# Patient Record
Sex: MALE | Race: BLACK OR AFRICAN AMERICAN | NOT HISPANIC OR LATINO | Employment: STUDENT | URBAN - METROPOLITAN AREA
[De-identification: names, ages, dates, MRNs, and addresses within clinical notes are randomized per-mention and may not be internally consistent; named-entity substitution may affect disease eponyms.]

---

## 2022-04-17 ENCOUNTER — HOSPITAL ENCOUNTER (EMERGENCY)
Facility: HOSPITAL | Age: 22
Discharge: HOME | End: 2022-04-17
Attending: EMERGENCY MEDICINE | Admitting: EMERGENCY MEDICINE
Payer: MEDICARE

## 2022-04-17 VITALS
RESPIRATION RATE: 18 BRPM | TEMPERATURE: 97.8 F | DIASTOLIC BLOOD PRESSURE: 112 MMHG | SYSTOLIC BLOOD PRESSURE: 145 MMHG | OXYGEN SATURATION: 100 % | HEART RATE: 65 BPM | WEIGHT: 155 LBS | BODY MASS INDEX: 22.96 KG/M2 | HEIGHT: 69 IN

## 2022-04-17 DIAGNOSIS — M79.10 SORE MUSCLES: Primary | ICD-10-CM

## 2022-04-17 DIAGNOSIS — T14.8XXA MUSCLE STRAIN: ICD-10-CM

## 2022-04-17 PROCEDURE — 99283 EMERGENCY DEPT VISIT LOW MDM: CPT

## 2022-04-17 PROCEDURE — 99284 EMERGENCY DEPT VISIT MOD MDM: CPT | Performed by: EMERGENCY MEDICINE

## 2022-04-17 RX ORDER — IBUPROFEN 600 MG/1
600 TABLET ORAL EVERY 6 HOURS PRN
Qty: 30 TABLET | Refills: 0 | Status: SHIPPED | OUTPATIENT
Start: 2022-04-17

## 2022-04-17 RX ORDER — IBUPROFEN 600 MG/1
600 TABLET ORAL ONCE
Status: COMPLETED | OUTPATIENT
Start: 2022-04-17 | End: 2022-04-17

## 2022-04-17 RX ADMIN — IBUPROFEN 600 MG: 600 TABLET, FILM COATED ORAL at 23:32

## 2022-04-17 NOTE — Clinical Note
Eva Hampton accompanied Rob Aguilar to the emergency department on 4/17/2022  Return date if applicable: 42/20/8615        If you have any questions or concerns, please don't hesitate to call        Saniya Cotton MD

## 2022-04-18 NOTE — ED PROVIDER NOTES
History  Chief Complaint   Patient presents with    Arm Pain     pt states he worked out the other day and had bad form and did everything the wrong way and now c/o bilateral arm pain  pt took motrin prior to arrival       HPI  59-year-old man with no significant past medical history presents to ED for evaluation of bilateral arm  Patient reports he was doing biceps exercises 3 days ago  He states he has not worked out 2 years  He states he had bad form when he was working out  He states he used too much weight  He states after the exercises, he felt like his biceps were sore  He states he wanted to get checked out today because he "does not know if this is normal to be this sore " He complains of worsening pain when he moves his arms  He has tried Motrin once for pain  He has not tried any other therapies  He has no other physical complaints or concerns at this time  None       History reviewed  No pertinent past medical history  History reviewed  No pertinent surgical history  History reviewed  No pertinent family history  I have reviewed and agree with the history as documented  E-Cigarette/Vaping     E-Cigarette/Vaping Substances     Social History     Tobacco Use    Smoking status: Current Every Day Smoker    Smokeless tobacco: Never Used   Substance Use Topics    Alcohol use: Yes    Drug use: Yes     Types: Marijuana        Review of Systems   All other systems reviewed and are negative        Physical Exam  ED Triage Vitals   Temperature Pulse Respirations Blood Pressure SpO2   04/17/22 2244 04/17/22 2241 04/17/22 2241 04/17/22 2241 04/17/22 2241   97 8 °F (36 6 °C) 65 18 (!) 145/112 100 %      Temp src Heart Rate Source Patient Position - Orthostatic VS BP Location FiO2 (%)   -- -- -- -- --             Pain Score       --                    Orthostatic Vital Signs  Vitals:    04/17/22 2241   BP: (!) 145/112   Pulse: 65       Physical Exam   Constitutional:  Well developed, no acute distress  HEENT:  Conjunctiva normal  Oropharynx moist  Respiratory:  No respiratory distress  Cardiovascular:  Normal rate  GI:  Soft, nondistended, nontender  :  No costovertebral angle tenderness   Musculoskeletal:  No edema, no deformities  TTP over bilateral upper arms  Integument:  Well hydrated, no rash   Lymphatic:  No lymphadenopathy noted   Neurologic:  Alert & oriented x 3, normal motor function, no focal deficits noted   Psychiatric:  Speech and behavior appropriate         ED Medications  Medications   ibuprofen (MOTRIN) tablet 600 mg (600 mg Oral Given 4/17/22 2332)       Diagnostic Studies  Results Reviewed     None                 No orders to display         Procedures  Procedures      ED Course                             SBIRT 20yo+      Most Recent Value   SBIRT (24 yo +)    In order to provide better care to our patients, we are screening all of our patients for alcohol and drug use  Would it be okay to ask you these screening questions? Yes Filed at: 04/17/2022 2327   Initial Alcohol Screen: US AUDIT-C     1  How often do you have a drink containing alcohol? 0 Filed at: 04/17/2022 2327   2  How many drinks containing alcohol do you have on a typical day you are drinking? 0 Filed at: 04/17/2022 2327   3a  Male UNDER 65: How often do you have five or more drinks on one occasion? 0 Filed at: 04/17/2022 2327   3b  FEMALE Any Age, or MALE 65+: How often do you have 4 or more drinks on one occassion? 0 Filed at: 04/17/2022 2327   Audit-C Score 0 Filed at: 04/17/2022 2327   SYLVIA: How many times in the past year have you    Used an illegal drug or used a prescription medication for non-medical reasons? Never Filed at: 04/17/2022 2327                MDM  Number of Diagnoses or Management Options  Sore muscles  Diagnosis management comments: 60-year-old man with no significant past medical history presents to ED for evaluation of bilateral arm after working out    Recommended that patient drink plenty water, take Motrin as needed and utilize hot baths or showers  Patient advised to avoid working out with bad form  Amount and/or Complexity of Data Reviewed  Review and summarize past medical records: yes  Discuss the patient with other providers: yes    Risk of Complications, Morbidity, and/or Mortality  Presenting problems: low  Diagnostic procedures: low    Patient Progress  Patient progress: improved      Disposition  Final diagnoses:   Sore muscles   Muscle strain     Time reflects when diagnosis was documented in both MDM as applicable and the Disposition within this note     Time User Action Codes Description Comment    4/17/2022 11:07 PM Emery Cortez Add [M79 10] Sore muscles     4/17/2022 11:33 PM Sheldon Swanson  8XXA] Muscle strain       ED Disposition     ED Disposition Condition Date/Time Comment    Discharge Stable Sun Apr 17, 2022 11:07 PM Anthony Tirado discharge to home/self care  Follow-up Information     Follow up With Specialties Details Why Contact Info Additional [address] Emergency Department Emergency Medicine Go to  As needed, If symptoms worsen [address]  [address] Emergency Department, [address] [place] [address] 108          Patient's Medications   Discharge Prescriptions    IBUPROFEN (MOTRIN) 600 MG TABLET    Take 1 tablet (600 mg total) by mouth every 6 (six) hours as needed for moderate pain       Start Date: 4/17/2022 End Date: --       Order Dose: 600 mg       Quantity: 30 tablet    Refills: 0     No discharge procedures on file  PDMP Review     None           ED Provider  Attending physically available and evaluated Anthony Tirado I managed the patient along with the ED Attending      Electronically Signed by         Pauline Robbins MD  04/17/22 0474

## 2022-04-18 NOTE — DISCHARGE INSTRUCTIONS
Hydrate well with water and electrolyte containing fluids  Please take Motrin and Tylenol for pain  Do gentle stretches of your biceps muscles, start by gently straightening your arms, one at a time  If your urine turns dark (coca cola colored or darker) or if you have no urine output in 12 hours, return to ER for re-evaluation

## 2022-04-20 NOTE — ED ATTENDING ATTESTATION
4/17/2022  I saw and evaluated the patient  I have discussed the patient with the resident physician and agree with the resident's findings, assessment and plan as documented in the resident physician's note, unless otherwise documented below  All available laboratory and imaging studies were reviewed by myself  I was present for key portions of any procedure(s) performed by the resident and I was immediately available to provide assistance  I agree with the current assessment done in the Emergency Department  I have conducted an independent evaluation of this patient  Emergency Department Note- Andrew Aggarwal 25 y o  male MRN: [id number]    Unit/Bed#: ED 10 Encounter: [id number]    Chief Complaint   Patient presents with    Arm Pain     pt states he worked out the other day and had bad form and did everything the wrong way and now c/o bilateral arm pain  pt took motrin prior to arrival         Andrew Aggarwal is a 25 y o  otherwise healthy male presenting with bilateral upper arm pain  Patient got back into exercising after having at 2 year hiatus and had an intense upper body workout 3 days ago  Immediately after the exercises 3 days ago, he felt that his biceps or sore, however, since then, pain significantly worsened  Any movement makes his biceps hurt  He has been trying to hydrate  He has been taking Tylenol and Motrin for his symptoms  This is the worst shortness he has ever felt  He has not had any other symptoms, such as decreased in urination, dark colored urine      REVIEW OF SYSTEMS    Constitutional: denies fevers, chills  Visual/Eyes: no changes in vision  HENT: no rhinorrhea, no sore throat  Cardiac: no chest pain  Respiratory: no shortness of breath, no cough  GI: no abdominal pain, nausea, vomiting, or diarrhea  :  As above  MSK:  As above  Heme/Onc: no easy bruising  Endocrine: no diabetes  Neuro: no focal weakness or numbness, no headaches    Ten systems reviewed and negative unless otherwise noted in HPI and above    PAST MEDICAL HISTORY  History reviewed  No pertinent past medical history  SURGICAL HISTORY  History reviewed  No pertinent surgical history  FAMILY HISTORY  History reviewed  No pertinent family history  CURRENT MEDICATIONS  No current facility-administered medications on file prior to encounter  No current outpatient medications on file prior to encounter  ALLERGIES  No Known Allergies    SOCIAL HISTORY  Social History     Socioeconomic History    Marital status: Single     Spouse name: None    Number of children: None    Years of education: None    Highest education level: None   Occupational History    None   Tobacco Use    Smoking status: Current Every Day Smoker    Smokeless tobacco: Never Used   Substance and Sexual Activity    Alcohol use: Yes    Drug use: Yes     Types: Marijuana    Sexual activity: None   Other Topics Concern    None   Social History Narrative    None     Social Determinants of Health     Financial Resource Strain: Not on file   Food Insecurity: Not on file   Transportation Needs: Not on file   Physical Activity: Not on file   Stress: Not on file   Social Connections: Not on file   Intimate Partner Violence: Not on file   Housing Stability: Not on file       PHYSICAL EXAM  BP (!) 145/112   Pulse 65   Temp 97 8 °F (36 6 °C)   Resp 18   Ht 5' 9" (1 753 m)   Wt 70 3 kg (155 lb)   SpO2 100%   BMI 22 89 kg/m²   Vital signs and nursing notes reviewed    Constitutional:  Awake, alert, oriented  No acute distress  HEENT:  Normocephalic, atraumatic  Sclera anicteric, conjunctiva not injected  Moist oral mucosa  Cardiac:  Appears well-perfused  Respiratory:  Breathing comfortably on room air  Abdomen:  Nondistended  Extremities:  Examination of left upper extremity reveals no deformities  Biceps muscle is tender to palpation  There is no ecchymosis  Biceps tendons are intact    Patient is able to demonstrate elbow flexion and pronation albeit weakly due to pain  Compartment is soft  Sensation to light touch is intact in median, radial, and ulnar nerve distributions  2+ radial pulse  Examination of right upper extremity reveals no deformities  Biceps muscle is tender to palpation  There is no ecchymosis  Biceps tendons are intact  Patient is able to demonstrate elbow flexion and pronation albeit weakly due to pain  Compartment is soft  Sensation to light touch is intact in median, radial, and ulnar nerve distributions  2+ radial pulse  Integument:  No rashes over exposed areas, cap refill less than 2 seconds  Neurologic:  Awake, alert, and oriented x3  Nonfocal exam   Psychiatric:  Normal affect    ED COURSE  Medications   ibuprofen (MOTRIN) tablet 600 mg (600 mg Oral Given 4/17/22 212)     20-year-old male presenting with bilateral biceps pain after intensive workout 3 days ago  Vital signs reviewed, afebrile, hypertensive, within normal limits otherwise  Differential diagnosis includes muscle overuse, rhabdomyolysis, tendon rupture, with other more rare etiologies such as deep space infection considered  On physical exam, bilateral biceps muscles are without evidence of rupture, tendons are without evidence of rupture, there is no pain out of proportion to exam   By history, patient has no decreased urination or dark urine to suggest rhabdomyolysis that is affecting his kidneys  I recommend aggressive hydration with water and electrolyte containing fluids  Patient may have Tylenol and Motrin for pain  Recommend gentle stretches to bilateral biceps muscles such as by gently crawling the backs of his fingers along the surface to gently extend his arms at the elbow to provide gentle stretch the biceps  Whenever pain is improved, recommend restarting exercises with a lesser weight or possibly with fewer reps/sets      Patient discharged to home with recommendations for symptom control, return precautions, and plan for follow up       CLINICAL IMPRESSION  Final diagnoses:   Sore muscles   Muscle strain       Discharge Medication List as of 4/17/2022 11:36 PM      START taking these medications    Details   ibuprofen (MOTRIN) 600 mg tablet Take 1 tablet (600 mg total) by mouth every 6 (six) hours as needed for moderate pain, Starting Sun 4/17/2022, Normal

## 2022-07-05 ENCOUNTER — HOSPITAL ENCOUNTER (EMERGENCY)
Facility: HOSPITAL | Age: 22
Discharge: HOME | End: 2022-07-05
Attending: EMERGENCY MEDICINE
Payer: MEDICARE

## 2022-07-05 ENCOUNTER — APPOINTMENT (EMERGENCY)
Dept: CT IMAGING | Facility: HOSPITAL | Age: 22
End: 2022-07-05
Payer: MEDICARE

## 2022-07-05 VITALS
OXYGEN SATURATION: 100 % | TEMPERATURE: 98.1 F | SYSTOLIC BLOOD PRESSURE: 136 MMHG | HEART RATE: 68 BPM | DIASTOLIC BLOOD PRESSURE: 73 MMHG | RESPIRATION RATE: 17 BRPM

## 2022-07-05 DIAGNOSIS — K59.00 CONSTIPATION, UNSPECIFIED CONSTIPATION TYPE: Primary | ICD-10-CM

## 2022-07-05 LAB
ALBUMIN SERPL BCP-MCNC: 4.1 G/DL (ref 3.5–5)
ALP SERPL-CCNC: 81 U/L (ref 34–104)
ALT SERPL W P-5'-P-CCNC: 15 U/L (ref 7–52)
ANION GAP SERPL CALCULATED.3IONS-SCNC: 6 MMOL/L (ref 4–13)
AST SERPL W P-5'-P-CCNC: 17 U/L (ref 13–39)
BASOPHILS # BLD AUTO: 0.03 THOUSANDS/ΜL (ref 0–0.1)
BASOPHILS NFR BLD AUTO: 0 % (ref 0–1)
BILIRUB SERPL-MCNC: 0.41 MG/DL (ref 0.2–1)
BUN SERPL-MCNC: 9 MG/DL (ref 5–25)
CALCIUM SERPL-MCNC: 9.3 MG/DL (ref 8.4–10.2)
CHLORIDE SERPL-SCNC: 105 MMOL/L (ref 96–108)
CO2 SERPL-SCNC: 26 MMOL/L (ref 21–32)
CREAT SERPL-MCNC: 1.25 MG/DL (ref 0.6–1.3)
CRP SERPL HS-MCNC: 4.39 MG/L
EOSINOPHIL # BLD AUTO: 0.07 THOUSAND/ΜL (ref 0–0.61)
EOSINOPHIL NFR BLD AUTO: 1 % (ref 0–6)
ERYTHROCYTE [DISTWIDTH] IN BLOOD BY AUTOMATED COUNT: 12.5 % (ref 11.6–15.1)
GFR SERPL CREATININE-BSD FRML MDRD: 81 ML/MIN/1.73SQ M
GLUCOSE SERPL-MCNC: 83 MG/DL (ref 65–140)
HCT VFR BLD AUTO: 41.3 % (ref 36.5–49.3)
HGB BLD-MCNC: 13.3 G/DL (ref 12–17)
IMM GRANULOCYTES # BLD AUTO: 0.02 THOUSAND/UL (ref 0–0.2)
IMM GRANULOCYTES NFR BLD AUTO: 0 % (ref 0–2)
LIPASE SERPL-CCNC: 49 U/L (ref 11–82)
LYMPHOCYTES # BLD AUTO: 1.68 THOUSANDS/ΜL (ref 0.6–4.47)
LYMPHOCYTES NFR BLD AUTO: 20 % (ref 14–44)
MCH RBC QN AUTO: 27.5 PG (ref 26.8–34.3)
MCHC RBC AUTO-ENTMCNC: 32.2 G/DL (ref 31.4–37.4)
MCV RBC AUTO: 85 FL (ref 82–98)
MONOCYTES # BLD AUTO: 0.74 THOUSAND/ΜL (ref 0.17–1.22)
MONOCYTES NFR BLD AUTO: 9 % (ref 4–12)
NEUTROPHILS # BLD AUTO: 5.77 THOUSANDS/ΜL (ref 1.85–7.62)
NEUTS SEG NFR BLD AUTO: 70 % (ref 43–75)
NRBC BLD AUTO-RTO: 0 /100 WBCS
PLATELET # BLD AUTO: 236 THOUSANDS/UL (ref 149–390)
PMV BLD AUTO: 10.3 FL (ref 8.9–12.7)
POTASSIUM SERPL-SCNC: 4.1 MMOL/L (ref 3.5–5.3)
PROT SERPL-MCNC: 7.7 G/DL (ref 6.4–8.4)
RBC # BLD AUTO: 4.84 MILLION/UL (ref 3.88–5.62)
SODIUM SERPL-SCNC: 137 MMOL/L (ref 135–147)
WBC # BLD AUTO: 8.31 THOUSAND/UL (ref 4.31–10.16)

## 2022-07-05 PROCEDURE — 80053 COMPREHEN METABOLIC PANEL: CPT | Performed by: EMERGENCY MEDICINE

## 2022-07-05 PROCEDURE — 86141 C-REACTIVE PROTEIN HS: CPT | Performed by: EMERGENCY MEDICINE

## 2022-07-05 PROCEDURE — 36415 COLL VENOUS BLD VENIPUNCTURE: CPT | Performed by: EMERGENCY MEDICINE

## 2022-07-05 PROCEDURE — 96361 HYDRATE IV INFUSION ADD-ON: CPT

## 2022-07-05 PROCEDURE — 83690 ASSAY OF LIPASE: CPT | Performed by: EMERGENCY MEDICINE

## 2022-07-05 PROCEDURE — 85025 COMPLETE CBC W/AUTO DIFF WBC: CPT | Performed by: EMERGENCY MEDICINE

## 2022-07-05 PROCEDURE — 74176 CT ABD & PELVIS W/O CONTRAST: CPT

## 2022-07-05 PROCEDURE — 96360 HYDRATION IV INFUSION INIT: CPT

## 2022-07-05 PROCEDURE — 99284 EMERGENCY DEPT VISIT MOD MDM: CPT

## 2022-07-05 PROCEDURE — 99284 EMERGENCY DEPT VISIT MOD MDM: CPT | Performed by: EMERGENCY MEDICINE

## 2022-07-05 RX ORDER — SODIUM CHLORIDE, SODIUM LACTATE, POTASSIUM CHLORIDE, CALCIUM CHLORIDE 600; 310; 30; 20 MG/100ML; MG/100ML; MG/100ML; MG/100ML
125 INJECTION, SOLUTION INTRAVENOUS CONTINUOUS
Status: DISCONTINUED | OUTPATIENT
Start: 2022-07-05 | End: 2022-07-05 | Stop reason: HOSPADM

## 2022-07-05 RX ORDER — POLYETHYLENE GLYCOL 3350 17 G/17G
17 POWDER, FOR SOLUTION ORAL DAILY
Qty: 507 G | Refills: 0 | Status: SHIPPED | OUTPATIENT
Start: 2022-07-05

## 2022-07-05 RX ADMIN — SODIUM CHLORIDE, SODIUM LACTATE, POTASSIUM CHLORIDE, AND CALCIUM CHLORIDE 125 ML/HR: .6; .31; .03; .02 INJECTION, SOLUTION INTRAVENOUS at 14:35

## 2022-07-05 NOTE — DISCHARGE INSTRUCTIONS
Most patients with irritable bowel syndrome (IBS) associate their symptoms with eating and many patients ease their symptoms by avoiding certain foods or using elimination diets  An elimination diet involves taking multiple foods out of your diet, followed by a period of reintroduction of these foods, in order to determine your personal food sensitivities  The most extensively studied elimination diet for IBS is the low FODMAP diet  FODMAP stands for Fermentable, Oligo-, Di-, Mono-saccharides And Polyols, and consists of groups of certain types of carbohydrates that are thought to trigger GI symptoms  The Low-FODMAP diet was conceived about 10 years ago about by [employer] researchers and is the elimination diet thought to be most effective for treating IBS related symptoms

## 2022-07-05 NOTE — ED PROVIDER NOTES
History  Chief Complaint   Patient presents with    Abdominal Pain     Pt states that abd pain has been lasting a few weeks  Pt says it happens often when he wakes up  Pt said anything he ate with sugar makes it worse  For approx six weeks pt has had bloating and generalized abdo discomfort episodes lasting hours, triggered by foods  No f/c/n/v/cp/sob  Multiple BMs per day non-bloody  No urinary symptoms  No FMH GI disease  Fruit common trigger  Prior to Admission Medications   Prescriptions Last Dose Informant Patient Reported? Taking?   ibuprofen (MOTRIN) 600 mg tablet Not Taking at Unknown time  No No   Sig: Take 1 tablet (600 mg total) by mouth every 6 (six) hours as needed for moderate pain   Patient not taking: Reported on 7/5/2022      Facility-Administered Medications: None       No past medical history on file  No past surgical history on file  No family history on file  I have reviewed and agree with the history as documented  E-Cigarette/Vaping    E-Cigarette Use Never User      E-Cigarette/Vaping Substances     Social History     Tobacco Use    Smoking status: Never Smoker    Smokeless tobacco: Never Used   Vaping Use    Vaping Use: Never used   Substance Use Topics    Alcohol use: Not Currently     Alcohol/week: 5 0 - 6 0 standard drinks     Types: 5 - 6 Standard drinks or equivalent per week     Comment: patient states he drinks every weekend    Drug use: Yes     Types: Marijuana       Review of Systems   Constitutional: Negative for fever  HENT: Negative for rhinorrhea  Eyes: Negative for visual disturbance  Respiratory: Negative for shortness of breath  Cardiovascular: Negative for chest pain  Gastrointestinal: Positive for abdominal distention, abdominal pain and diarrhea  Negative for vomiting  Endocrine: Negative for polydipsia  Genitourinary: Negative for dysuria, frequency and hematuria  Musculoskeletal: Negative for neck stiffness     Skin: Negative for rash  Allergic/Immunologic: Negative for immunocompromised state  Neurological: Negative for speech difficulty, weakness and numbness  Psychiatric/Behavioral: Negative for suicidal ideas  Physical Exam  Physical Exam  Constitutional:       General: He is not in acute distress  HENT:      Head: Normocephalic and atraumatic  Right Ear: External ear normal       Left Ear: External ear normal       Nose: Nose normal       Mouth/Throat:      Pharynx: Oropharynx is clear  Eyes:      Conjunctiva/sclera: Conjunctivae normal    Cardiovascular:      Rate and Rhythm: Normal rate and regular rhythm  Heart sounds: Normal heart sounds  No murmur heard  Pulmonary:      Effort: Pulmonary effort is normal       Breath sounds: Normal breath sounds  Abdominal:      General: Bowel sounds are normal       Palpations: Abdomen is soft  There is no mass  Tenderness: There is no abdominal tenderness  There is no guarding or rebound  Musculoskeletal:         General: No swelling or tenderness  Cervical back: Normal range of motion and neck supple  Right lower leg: No edema  Left lower leg: No edema  Skin:     General: Skin is warm and dry  Capillary Refill: Capillary refill takes less than 2 seconds  Neurological:      General: No focal deficit present  Mental Status: He is alert and oriented to person, place, and time     Psychiatric:         Mood and Affect: Mood normal          Vital Signs  ED Triage Vitals   Temperature Pulse Respirations Blood Pressure SpO2   07/05/22 1453 07/05/22 1453 07/05/22 1453 07/05/22 1453 07/05/22 1453   97 8 °F (36 6 °C) 59 18 114/74 98 %      Temp Source Heart Rate Source Patient Position - Orthostatic VS BP Location FiO2 (%)   07/05/22 1453 07/05/22 1453 07/05/22 1453 07/05/22 1453 --   Oral Monitor Lying Left arm       Pain Score       07/05/22 1410       No Pain           Vitals:    07/05/22 1453 07/05/22 1658   BP: 114/74 136/73 Pulse: 59 68   Patient Position - Orthostatic VS: Lying Lying         Visual Acuity      ED Medications  Medications   barium (READI-CAT 2) suspension 900 mL (900 mL Oral Given 7/5/22 1747)       Diagnostic Studies  Results Reviewed     Procedure Component Value Units Date/Time    Comprehensive metabolic panel [[id number]] Collected: 07/05/22 1435    Lab Status: Final result Specimen: Blood from Arm, Right Updated: 07/05/22 1500     Sodium 137 mmol/L      Potassium 4 1 mmol/L      Chloride 105 mmol/L      CO2 26 mmol/L      ANION GAP 6 mmol/L      BUN 9 mg/dL      Creatinine 1 25 mg/dL      Glucose 83 mg/dL      Calcium 9 3 mg/dL      AST 17 U/L      ALT 15 U/L      Alkaline Phosphatase 81 U/L      Total Protein 7 7 g/dL      Albumin 4 1 g/dL      Total Bilirubin 0 41 mg/dL      eGFR 81 ml/min/1 73sq m     Narrative:      [place] guidelines for Chronic Kidney Disease (CKD):     Stage 1 with normal or high GFR (GFR > 90 mL/min/1 73 square meters)    Stage 2 Mild CKD (GFR = 60-89 mL/min/1 73 square meters)    Stage 3A Moderate CKD (GFR = 45-59 mL/min/1 73 square meters)    Stage 3B Moderate CKD (GFR = 30-44 mL/min/1 73 square meters)    Stage 4 Severe CKD (GFR = 15-29 mL/min/1 73 square meters)    Stage 5 End Stage CKD (GFR <15 mL/min/1 73 square meters)  Note: GFR calculation is accurate only with a steady state creatinine    Lipase [[id number]]  (Normal) Collected: 07/05/22 1435    Lab Status: Final result Specimen: Blood from Arm, Right Updated: 07/05/22 1500     Lipase 49 u/L     CBC and differential [[id number]] Collected: 07/05/22 1435    Lab Status: Final result Specimen: Blood from Arm, Right Updated: 07/05/22 1440     WBC 8 31 Thousand/uL      RBC 4 84 Million/uL      Hemoglobin 13 3 g/dL      Hematocrit 41 3 %      MCV 85 fL      MCH 27 5 pg      MCHC 32 2 g/dL      RDW 12 5 %      MPV 10 3 fL      Platelets 278 Thousands/uL      nRBC 0 /100 WBCs      Neutrophils Relative 70 % Immat GRANS % 0 %      Lymphocytes Relative 20 %      Monocytes Relative 9 %      Eosinophils Relative 1 %      Basophils Relative 0 %      Neutrophils Absolute 5 77 Thousands/µL      Immature Grans Absolute 0 02 Thousand/uL      Lymphocytes Absolute 1 68 Thousands/µL      Monocytes Absolute 0 74 Thousand/µL      Eosinophils Absolute 0 07 Thousand/µL      Basophils Absolute 0 03 Thousands/µL     High sensitivity CRP [[id number]] Collected: 07/05/22 1435    Lab Status: In process Specimen: Blood from Arm, Right Updated: 07/05/22 1438                 CT abdomen pelvis wo contrast   Final Result by Karon Holter, MD (07/05 1820)   Moderate fecal stasis consistent with constipation  Remainder the examination is unremarkable  Workstation performed: [id number]                    Procedures  Procedures         ED Course                               SBIRT 22yo+    Flowsheet Row Most Recent Value   SBIRT (23 yo +)    In order to provide better care to our patients, we are screening all of our patients for alcohol and drug use  Would it be okay to ask you these screening questions? Yes Filed at: 07/05/2022 1415   Initial Alcohol Screen: US AUDIT-C     1  How often do you have a drink containing alcohol? 3 Filed at: 07/05/2022 1415   2  How many drinks containing alcohol do you have on a typical day you are drinking? 4 Filed at: 07/05/2022 1415   3a  Male UNDER 65: How often do you have five or more drinks on one occasion? 3 Filed at: 07/05/2022 1415   Audit-C Score 10 Filed at: 07/05/2022 1415                    MDM  Number of Diagnoses or Management Options  Constipation, unspecified constipation type  Diagnosis management comments: Symptoms highly suggestive of IBS  Had shared decision making with patient, explained labs and imaging may be low yield, but he wishes to get both  He demonstrates understanding of risks of CT  CT showed constipation   Rx miralax, GI referral, low fodmap info       Disposition  Final diagnoses:   Constipation, unspecified constipation type     Time reflects when diagnosis was documented in both MDM as applicable and the Disposition within this note     Time User Action Codes Description Comment    7/5/2022  6:29 PM Tenzin Patino Add [K59 00] Constipation, unspecified constipation type       ED Disposition     ED Disposition   Discharge    Condition   Stable    Date/Time   Tue Jul 5, 2022  6:30 PM    Comment   Chantal Corbin discharge to home/self care  Follow-up Information     Follow up With Specialties Details Why Contact Info Additional [facility] Schedule an appointment as soon as possible for a visit in 1 week  [address] [facility] [phone] 79 [facility] [place], [address] [facility], [address] [place] [address], [phone]          Discharge Medication List as of 7/5/2022  6:32 PM      START taking these medications    Details   polyethylene glycol (GLYCOLAX) 17 GM/SCOOP powder Take 17 g by mouth daily, Starting Tue 7/5/2022, Normal         CONTINUE these medications which have NOT CHANGED    Details   ibuprofen (MOTRIN) 600 mg tablet Take 1 tablet (600 mg total) by mouth every 6 (six) hours as needed for moderate pain, Starting Sun 4/17/2022, Normal             No discharge procedures on file      PDMP Review     None          ED Provider  Electronically Signed by           Jesse Rosado MD  07/05/22 [phone]

## 2022-07-18 ENCOUNTER — APPOINTMENT (OUTPATIENT)
Dept: PHYSICAL THERAPY | Facility: CLINIC | Age: 22
End: 2022-07-18

## 2022-07-18 PROCEDURE — 97530 THERAPEUTIC ACTIVITIES: CPT

## 2023-04-26 ENCOUNTER — HOSPITAL ENCOUNTER (EMERGENCY)
Facility: HOSPITAL | Age: 23
Discharge: HOME | End: 2023-04-26
Attending: EMERGENCY MEDICINE

## 2023-04-26 ENCOUNTER — APPOINTMENT (EMERGENCY)
Dept: RADIOLOGY | Facility: HOSPITAL | Age: 23
End: 2023-04-26

## 2023-04-26 VITALS
WEIGHT: 150 LBS | BODY MASS INDEX: 22.73 KG/M2 | DIASTOLIC BLOOD PRESSURE: 76 MMHG | HEART RATE: 87 BPM | HEIGHT: 68 IN | SYSTOLIC BLOOD PRESSURE: 150 MMHG | RESPIRATION RATE: 18 BRPM | TEMPERATURE: 98.7 F | OXYGEN SATURATION: 100 %

## 2023-04-26 DIAGNOSIS — M25.461 EFFUSION OF RIGHT KNEE: Primary | ICD-10-CM

## 2023-04-26 LAB
APPEARANCE FLD: ABNORMAL
B BURGDOR IGG+IGM SER-ACNC: >8 AI
COLOR FLD: YELLOW
CRYSTALS SNV QL MICRO: NORMAL
GRAM STN SPEC: NORMAL
LYMPHOCYTES # SNV MANUAL: 7 %
MONOCYTES NFR SNV MANUAL: 16 %
NEUTROPHILS NFR SNV MANUAL: 77 %
RBC # SNV MANUAL: 2000 /UL (ref 0–10)
SITE: ABNORMAL
TOTAL CELLS COUNTED SPEC: 100
WBC # FLD MANUAL: ABNORMAL /UL (ref 0–200)

## 2023-04-26 RX ORDER — IBUPROFEN 600 MG/1
600 TABLET ORAL ONCE
Status: COMPLETED | OUTPATIENT
Start: 2023-04-26 | End: 2023-04-26

## 2023-04-26 RX ORDER — LIDOCAINE HYDROCHLORIDE AND EPINEPHRINE 10; 10 MG/ML; UG/ML
10 INJECTION, SOLUTION INFILTRATION; PERINEURAL ONCE
Status: COMPLETED | OUTPATIENT
Start: 2023-04-26 | End: 2023-04-26

## 2023-04-26 RX ORDER — NAPROXEN 500 MG/1
500 TABLET ORAL 2 TIMES DAILY WITH MEALS
Qty: 14 TABLET | Refills: 0 | Status: SHIPPED | OUTPATIENT
Start: 2023-04-26 | End: 2023-05-03

## 2023-04-26 RX ADMIN — IBUPROFEN 600 MG: 600 TABLET, FILM COATED ORAL at 15:40

## 2023-04-26 RX ADMIN — LIDOCAINE HYDROCHLORIDE,EPINEPHRINE BITARTRATE 10 ML: 10; .01 INJECTION, SOLUTION INFILTRATION; PERINEURAL at 14:27

## 2023-04-26 NOTE — Clinical Note
Horacio Castro was seen and treated in our emergency department on 4/26/2023  No restrictions            Diagnosis:     Devin Chi  may return to work on return date  He may return on this date: 04/28/2023         If you have any questions or concerns, please don't hesitate to call        Lexi Jarrell PA-C    ______________________________           _______________          _______________  Hospital Representative                              Date                                Time

## 2023-04-26 NOTE — ED PROVIDER NOTES
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 Taking?   ibuprofen (MOTRIN) 600 mg tablet   No No   Sig: Take 1 tablet (600 mg total) by mouth every 6 (six) hours as needed for moderate pain   Patient not taking: Reported on 7/5/2022   polyethylene glycol (GLYCOLAX) 17 GM/SCOOP powder   No No   Sig: Take 17 g by mouth daily      Facility-Administered Medications: None       History reviewed  No pertinent past medical history  History reviewed  No pertinent surgical history  History reviewed  No pertinent family history  I have reviewed and agree with the history as documented      E-Cigarette/Vaping   • E-Cigarette Use Never User      E-Cigarette/Vaping Substances     Social History     Tobacco Use   • Smoking status: Never   • Smokeless tobacco: Never   Vaping Use   • Vaping Use: Never used   Substance Use Topics   • Alcohol use: Not Currently     Alcohol/week: 5 0 - 6 0 standard drinks     Types: 5 - 6 Standard drinks or equivalent per week     Comment: patient states he drinks every weekend   • Drug use: Yes     Types: Marijuana       Review of Systems   Constitutional: Negative for chills and fever  Gastrointestinal: Negative for nausea and vomiting  Musculoskeletal: Positive for joint swelling  Skin: Negative for rash and wound  Neurological: Negative for weakness and numbness  Physical Exam  Physical Exam  Vitals and nursing note reviewed  Constitutional:       Appearance: Normal appearance  He is well-developed  HENT:      Head: Atraumatic  Eyes:      Conjunctiva/sclera: Conjunctivae normal    Cardiovascular:      Pulses: Normal pulses  Musculoskeletal:      Right knee: Swelling, effusion (moderate) and bony tenderness present  No deformity, erythema or ecchymosis  Decreased range of motion (mild)  Tenderness present  No LCL laxity or MCL laxity  Instability Tests: Anterior drawer test negative  Posterior drawer test negative  Anterior Lachman test negative  Medial Tanika test negative and lateral Tanika test negative  Left knee: Normal    Skin:     General: Skin is warm and dry  Capillary Refill: Capillary refill takes less than 2 seconds  Neurological:      Mental Status: He is alert  Psychiatric:         Mood and Affect: Mood normal          Vital Signs  ED Triage Vitals [04/26/23 1333]   Temperature Pulse Respirations Blood Pressure SpO2   98 7 °F (37 1 °C) 87 18 150/76 100 %      Temp Source Heart Rate Source Patient Position - Orthostatic VS BP Location FiO2 (%)   Oral Monitor Lying Right arm --      Pain Score       4           Vitals:    04/26/23 1333   BP: 150/76   Pulse: 87   Patient Position - Orthostatic VS: Lying         Visual Acuity      ED Medications  Medications   lidocaine-epinephrine (XYLOCAINE/EPINEPHRINE) 1 %-1:100,000 injection 10 mL (10 mL Infiltration Given by Other 4/26/23 1427)   ibuprofen (MOTRIN) tablet 600 mg (600 mg Oral Given 4/26/23 1540)       Diagnostic Studies  Results Reviewed     Procedure Component Value Units Date/Time    Synovial Fluid Diff [[id number]] Collected: 04/26/23 1518    Lab Status:  In process Specimen: Synovial Fluid from Joint, Right Knee Updated: 04/26/23 1711    STAT Gram Stain [[id number]] Collected: 04/26/23 1518    Lab Status: Final result Specimen: Synovial Fluid from Joint, Right Knee Updated: 04/26/23 1707     Gram Stain Result 4+ Polys      No Mononuclear Cells      No No bacteria seen    Synovial fluid, crystal [[id number]] Collected: 04/26/23 1518    Lab Status: Final result Specimen: Synovial Fluid from Joint, Right Knee Updated: 04/26/23 1639     Crystals, Synovial Fluid No Crystals Seen    Synovial fluid, Culture and Gram stain [[id number]] Updated: 04/26/23 1531    Lab Status: In process Specimen: Synovial Fluid from Joint, Right Knee     Synovial fluid, white cell count w/ diff [[id number]] Collected: 04/26/23 1518    Lab Status: In process Specimen: Synovial Fluid from Joint, Right Knee Updated: 04/26/23 1531    Synovial fluid, RBC count [[id number]] Collected: 04/26/23 1518    Lab Status: In process Specimen: Synovial Fluid from Joint, Right Knee Updated: 04/26/23 1531    Lyme Antibody Profile with reflex to [facility] [[id number]] Collected: 04/26/23 1427    Lab Status:  In process Specimen: Blood from Arm, Right Updated: 04/26/23 1433    Chlamydia/GC amplified DNA by PCR [[id number]]     Lab Status: No result Specimen: Genital from Cervix                  XR knee 4+ views Right injury   ED Interpretation by Carley Cuevas PA-C (04/26 1438)   + effusion, no fx                 Procedures  Arthrocentesis    Date/Time: 4/26/2023 3:09 PM  Performed by: Carley Cuevas PA-C  Authorized by: Carley Cuevas PA-C     Location:  Bedside  Verbal consent obtained?: Yes    Risks and benefits: Risks, benefits and alternatives were discussed    Consent given by:  Patient  Patient states understanding of procedure being performed: Yes    Patient's understanding of procedure matches consent: Yes    Patient identity confirmed:  Verbally with patient  Indications:  Joint swelling and diagnostic evaluation  Body area: Knee  Joint:  Right knee  Local anesthesia used?: Yes    Anesthesia:  Local infiltration  Local anesthetic:  Lidocaine 1% with epinephrine  Anesthetic total (ml):  6  Preparation: skin prepped with chlorhexidine, sterile gloves used, however without drape  Needle size:  18 G  Ultrasound guidance: No    Approach:  Lateral  Aspirate amount (ml):  70  Aspirate:  Yellow  Patient tolerance:  Patient tolerated the procedure well with no immediate complications      History of applied to right knee by myself, neurovascular intact status post translocation      ED Course  ED Course as of 04/26/23 1711   Wed Apr 26, 2023   1608 Patient states pain is improved, a/w gram stain prior to disposition                                             Medical Decision Making  Differential diagnosis includes but is not limited to: lyme, gonorrhea, gout - inflammatory arthritis    Encouraged orthopedic follow-up, return precautions given, patient understands and agrees to do so  Amount and/or Complexity of Data Reviewed  Labs: ordered  Details: reviewed  Radiology: ordered and independent interpretation performed  Risk  Prescription drug management  Disposition  Final diagnoses:   Effusion of right knee     Time reflects when diagnosis was documented in both MDM as applicable and the Disposition within this note     Time User Action Codes Description Comment    4/26/2023  4:56 PM Clarice Murray Add [M25 461] Effusion of right knee       ED Disposition     ED Disposition   Discharge    Condition   Stable    Date/Time   Wed Apr 26, 2023  4:56 PM    Comment   Sherice Moscoso discharge to home/self care                 Follow-up Information     Follow up With Specialties Details Why Contact Info Additional Information    [facility] Orthopedic Surgery In 3 days  [address] [facility] [place] [address] Adelaida Najera [place], [place], [address]  Use Entrance A     [facility] Emergency Department Emergency Medicine  If symptoms worsen [address] [id number]  [address] [facility] [address] Emergency Department, [address], [place], [place], [address] [phone]          Patient's Medications   Discharge Prescriptions    NAPROXEN (NAPROSYN) 500 MG TABLET    Take 1 tablet (500 mg total) by mouth 2 (two) times a day with meals for 7 days       Start Date: 4/26/2023 End Date: 5/3/2023       Order Dose: 500 mg       Quantity: 14 tablet    Refills: 0       No discharge procedures on file      PDMP Review     None          ED Provider  Electronically Signed by           Imelda Miles PA-C  04/26/23 [address] Karsten Shea PA-C  04/26/23 9154

## 2023-04-26 NOTE — Clinical Note
Faraz Jignesh was seen and treated in our emergency department on 4/26/2023  No restrictions            Diagnosis:     Gaby Wang  may return to work on return date  He may return on this date: 04/28/2023         If you have any questions or concerns, please don't hesitate to call        Marty Calzada PA-C    ______________________________           _______________          _______________  Hospital Representative                              Date                                Time

## 2023-04-27 LAB
C TRACH DNA SPEC QL NAA+PROBE: NEGATIVE
N GONORRHOEA DNA SPEC QL NAA+PROBE: NEGATIVE

## 2023-04-28 LAB
B BURGDOR IGG PATRN SER IB-IMP: POSITIVE
B BURGDOR IGM PATRN SER IB-IMP: POSITIVE
B BURGDOR18KD IGG SER QL IB: PRESENT
B BURGDOR23KD IGG SER QL IB: ABNORMAL
B BURGDOR23KD IGM SER QL IB: PRESENT
B BURGDOR28KD IGG SER QL IB: ABNORMAL
B BURGDOR30KD IGG SER QL IB: ABNORMAL
B BURGDOR39KD IGG SER QL IB: PRESENT
B BURGDOR39KD IGM SER QL IB: ABNORMAL
B BURGDOR41KD IGG SER QL IB: PRESENT
B BURGDOR41KD IGM SER QL IB: PRESENT
B BURGDOR45KD IGG SER QL IB: ABNORMAL
B BURGDOR58KD IGG SER QL IB: PRESENT
B BURGDOR66KD IGG SER QL IB: ABNORMAL
B BURGDOR93KD IGG SER QL IB: PRESENT

## 2023-04-28 RX ORDER — DOXYCYCLINE HYCLATE 100 MG/1
100 CAPSULE ORAL 2 TIMES DAILY
Qty: 42 CAPSULE | Refills: 0 | Status: SHIPPED | OUTPATIENT
Start: 2023-04-28 | End: 2023-05-19

## 2023-04-28 NOTE — ED RE-EVALUATION NOTE
Reviewed preliminary Lyme results with patient, prescription for doxycycline sent over to pharmacy       Abhijeet Riddle PA-C  04/28/23 4391

## 2023-04-29 LAB
BACTERIA SPEC BFLD CULT: NO GROWTH
GRAM STN SPEC: NORMAL
GRAM STN SPEC: NORMAL

## 2023-05-03 ENCOUNTER — TELEPHONE (OUTPATIENT)
Dept: INFECTIOUS DISEASES | Facility: CLINIC | Age: 23
End: 2023-05-03

## 2023-05-03 VITALS
HEART RATE: 88 BPM | SYSTOLIC BLOOD PRESSURE: 138 MMHG | WEIGHT: 149.2 LBS | BODY MASS INDEX: 22.61 KG/M2 | DIASTOLIC BLOOD PRESSURE: 89 MMHG | HEIGHT: 68 IN | OXYGEN SATURATION: 100 %

## 2023-05-03 DIAGNOSIS — M25.461 EFFUSION OF RIGHT KNEE: ICD-10-CM

## 2023-05-03 DIAGNOSIS — A69.23 LYME ARTHRITIS OF KNEE (HCC): Primary | ICD-10-CM

## 2023-05-03 NOTE — PROGRESS NOTES
Assessment/Plan   Diagnoses and all orders for this visit:        Lyme arthritis of knee ([address] )    Effusion of right knee    - Continue doxycycline and naprosyn  - Refer to ID for lyme - Dr Candace Lord  - Follow up with Dr Fina liriano for the knee          Subjective   Patient ID: Yovani Oliveira is a 26 y o  male  Vitals:    05/03/23 1456   BP: 138/89   Pulse: 88   SpO2: 100%     24yo male comes in for an evaluation of his right knee  He started having some swelling on 4-23-23  No injury  No fevers or chills  He went to the ED on 4-26-23 and had it aspirated  His Lyme (western blot) was +  Synovial fluid culture, gram stain, gonococcal, and crystals were all negative  He does not recall any tick bite history  He has been taking doxycycline and naprosyn  He is tolerating them well and his knee is felling much better  He wants to return to work on Friday ([address])  The pain is dull in character, mild in severity, pain does not radiate and is not associated with numbness  The following portions of the patient's history were reviewed and updated as appropriate: allergies, current medications, past family history, past medical history, past social history, past surgical history and problem list     Review of Systems  Ortho Exam  Past Medical History:   Diagnosis Date    Lyme disease      History reviewed  No pertinent surgical history  History reviewed  No pertinent family history  Social History     Occupational History    Not on file   Tobacco Use    Smoking status: Never    Smokeless tobacco: Never   Vaping Use    Vaping Use: Never used   Substance and Sexual Activity    Alcohol use: Not Currently     Alcohol/week: 5 0 - 6 0 standard drinks     Types: 5 - 6 Standard drinks or equivalent per week     Comment: patient states he drinks every weekend    Drug use: Yes     Types: Marijuana    Sexual activity: Not on file       Review of Systems   Constitutional: Negative  HENT: Negative  Eyes: Negative  Respiratory: Negative  Cardiovascular: Negative  Gastrointestinal: Negative  Endocrine: Negative  Genitourinary: Negative  Musculoskeletal: As below      Allergic/Immunologic: Negative  Neurological: Negative  Hematological: Negative  Psychiatric/Behavioral: Negative  Objective   Physical Exam    · Constitutional: Awake, Alert, Oriented  · Eyes: EOMI  · Psych: Mood and affect appropriate  · Heart: regular rate   · Lungs: No audible wheezing  · Abdomen: No guarding  · Lymph: no lymphedema   right Knee:  - Appearance   Swelling, no discoloration, no deformity, no ecchymosis and no erythema  - Effusion   moderate  - Palpation   No tenderness about the medial / lateral joint line, patella, patellar tendon, MCL, LCL, hamstrings, or medial / lateral tibial plateau   - ROM   Extension: 0 and Flexion: 130  - Special Tests   Tanika's Test negative, Lachman's Test negative, Anterior Drawer Test negative, Posterior Drawer Test negative, Valgus Stress Test negative, Varus Stress Test negative and Patellar apprehension negative  - Motor   normal 5/5 in all planes  - NVI distally    I have personally reviewed pertinent films in PACS and my interpretation is No acute displaced fracture  + effusion  Regan Alberto

## 2023-05-03 NOTE — LETTER
May 3, 2023     Patient: Elsa Sullivan  YOB: 2000  Date of Visit: 5/3/2023      To Whom it May Concern:    Elsa Sullivan is under my professional care  Samanta Gonzalez was seen in my office on 5/3/2023  Samanta Gonzalez may return to work on 5-5-23       If you have any questions or concerns, please don't hesitate to call           Sincerely,          Ade Buchanan PA-C        CC: No Recipients

## 2023-06-21 ENCOUNTER — HOSPITAL ENCOUNTER (EMERGENCY)
Facility: HOSPITAL | Age: 23
Discharge: HOME | End: 2023-06-22
Attending: EMERGENCY MEDICINE
Payer: MEDICARE

## 2023-06-21 VITALS
HEART RATE: 69 BPM | SYSTOLIC BLOOD PRESSURE: 127 MMHG | TEMPERATURE: 98.1 F | DIASTOLIC BLOOD PRESSURE: 73 MMHG | RESPIRATION RATE: 18 BRPM | OXYGEN SATURATION: 99 %

## 2023-06-21 DIAGNOSIS — M25.561 RIGHT KNEE PAIN, UNSPECIFIED CHRONICITY: Primary | ICD-10-CM

## 2023-06-21 PROCEDURE — 99283 EMERGENCY DEPT VISIT LOW MDM: CPT

## 2023-06-21 NOTE — Clinical Note
Kizzy Yung was seen and treated in our emergency department on 6/21/2023  No restrictions            Diagnosis:     Sheri Horton  may return to work on return date  He may return on this date: 06/26/2023         If you have any questions or concerns, please don't hesitate to call        Doretha Gonzalez DO    ______________________________           _______________          _______________  Hospital Representative                              Date                                Time

## 2023-06-22 ENCOUNTER — APPOINTMENT (EMERGENCY)
Dept: RADIOLOGY | Facility: HOSPITAL | Age: 23
End: 2023-06-22
Payer: MEDICARE

## 2023-06-22 PROCEDURE — 73564 X-RAY EXAM KNEE 4 OR MORE: CPT

## 2023-06-22 PROCEDURE — 96372 THER/PROPH/DIAG INJ SC/IM: CPT

## 2023-06-22 RX ORDER — KETOROLAC TROMETHAMINE 30 MG/ML
15 INJECTION, SOLUTION INTRAMUSCULAR; INTRAVENOUS ONCE
Status: COMPLETED | OUTPATIENT
Start: 2023-06-22 | End: 2023-06-22

## 2023-06-22 RX ORDER — ACETAMINOPHEN 325 MG/1
975 TABLET ORAL ONCE
Status: COMPLETED | OUTPATIENT
Start: 2023-06-22 | End: 2023-06-22

## 2023-06-22 RX ADMIN — ACETAMINOPHEN 975 MG: 325 TABLET ORAL at 00:47

## 2023-06-22 RX ADMIN — KETOROLAC TROMETHAMINE 15 MG: 30 INJECTION, SOLUTION INTRAMUSCULAR; INTRAVENOUS at 00:47

## 2023-06-22 NOTE — DISCHARGE INSTRUCTIONS
You were seen and evaluated in the emergency department for right knee pain  Likely overuse versus subacute injury  Unlikely to be reinfected with Lyme  Would follow-up with orthopedics we will send referral   Tylenol Motrin as needed for pain and swelling  Rest the knee, activity as tolerated  If symptoms worsen or persist you develop a fever the joint becomes extremely swollen tender red and warm return immediately to the emergency department for further evaluation and management    These follow-up with your PCP within 48 hours

## 2023-06-22 NOTE — ED ATTENDING ATTESTATION
6/21/2023  IPhillip DO, saw and evaluated the patient  I have discussed the patient with the resident/non-physician practitioner and agree with the resident's/non-physician practitioner's findings, Plan of Care, and MDM as documented in the resident's/non-physician practitioner's note, except where noted  All available labs and Radiology studies were reviewed  I was present for key portions of any procedure(s) performed by the resident/non-physician practitioner and I was immediately available to provide assistance  At this point I agree with the current assessment done in the Emergency Department  I have conducted an independent evaluation of this patient a history and physical is as follows:    26 yom with right knee pain  Hx lyme arthritis/effusion in that knee in 4/23  Was on doxy,completed full course  Now with pain/mild swelling same knee  No injury  Doubt septic arthritis, doubt recurrent lyme   Likely overuse?xray, toradol  ED Course         Critical Care Time  Procedures

## 2023-06-22 NOTE — ED PROVIDER NOTES
History  Chief Complaint   Patient presents with   • Knee Pain     Pt was diagnosed with lyme arthritis in April  Pt reports feeling pain in his R knee yesterday at work and its gotten worse today  Patient is a 73-year-old male no past medical history presenting to the ED for evaluation of right knee pain and swelling x1 day  Patient reports that he was diagnosed with Lyme arthritis in April had effusion of the knee that was drained via arthrocentesis at that time  Took 10-day course of doxycycline with resolution of his symptoms until yesterday when he noticed that his right knee again became painful and swollen  No erythema or warmth  No difficulty ambulating  No fever chills  No other arthralgias myalgias bony tenderness anywhere  Denies trauma or inciting incident event  Denies any other complaints at this time  Prior to Admission Medications   Prescriptions Last Dose Informant Patient Reported? Taking?   ibuprofen (MOTRIN) 600 mg tablet   No No   Sig: Take 1 tablet (600 mg total) by mouth every 6 (six) hours as needed for moderate pain   Patient not taking: Reported on 7/5/2022   naproxen (NAPROSYN) 500 mg tablet   No No   Sig: Take 1 tablet (500 mg total) by mouth 2 (two) times a day with meals for 7 days   polyethylene glycol (GLYCOLAX) 17 GM/SCOOP powder   No No   Sig: Take 17 g by mouth daily   Patient not taking: Reported on 5/3/2023      Facility-Administered Medications: None       Past Medical History:   Diagnosis Date   • Lyme disease        History reviewed  No pertinent surgical history  History reviewed  No pertinent family history  I have reviewed and agree with the history as documented      E-Cigarette/Vaping   • E-Cigarette Use Never User      E-Cigarette/Vaping Substances   • Nicotine No    • THC No    • CBD No    • Flavoring No    • Other No    • Unknown No      Social History     Tobacco Use   • Smoking status: Never   • Smokeless tobacco: Never   Vaping Use   • Vaping Use: Never used   Substance Use Topics   • Alcohol use: Not Currently     Alcohol/week: 5 0 - 6 0 standard drinks of alcohol     Types: 5 - 6 Standard drinks or equivalent per week     Comment: patient states he drinks every weekend   • Drug use: Yes     Types: Marijuana        Review of Systems   Constitutional: Negative for chills and fever  Respiratory: Negative for shortness of breath  Cardiovascular: Negative for chest pain  Gastrointestinal: Negative for abdominal pain, nausea and vomiting  Musculoskeletal: Negative for arthralgias, back pain, myalgias and neck pain  Skin: Negative for rash  Neurological: Negative for headaches  All other systems reviewed and are negative  Physical Exam  ED Triage Vitals [06/21/23 2246]   Temperature Pulse Respirations Blood Pressure SpO2   98 1 °F (36 7 °C) 69 18 127/73 99 %      Temp Source Heart Rate Source Patient Position - Orthostatic VS BP Location FiO2 (%)   Oral Monitor Sitting Right arm --      Pain Score       3             Orthostatic Vital Signs  Vitals:    06/21/23 2246   BP: 127/73   Pulse: 69   Patient Position - Orthostatic VS: Sitting       Physical Exam  Vitals and nursing note reviewed  Constitutional:       General: He is not in acute distress  Appearance: Normal appearance  He is normal weight  He is not ill-appearing or diaphoretic  HENT:      Head: Normocephalic and atraumatic  Mouth/Throat:      Mouth: Mucous membranes are moist    Eyes:      Extraocular Movements: Extraocular movements intact  Cardiovascular:      Rate and Rhythm: Normal rate and regular rhythm  Pulmonary:      Effort: Pulmonary effort is normal       Breath sounds: Normal breath sounds  Abdominal:      Palpations: Abdomen is soft  Tenderness: There is no abdominal tenderness  Musculoskeletal:         General: Normal range of motion  Cervical back: Normal range of motion  Right knee: Swelling present   No effusion, erythema, ecchymosis, bony tenderness or crepitus  Normal range of motion  No tenderness  Left knee: Normal    Skin:     General: Skin is warm and dry  Neurological:      General: No focal deficit present  Mental Status: He is alert and oriented to person, place, and time  ED Medications  Medications   ketorolac (TORADOL) injection 15 mg (15 mg Intramuscular Given 6/22/23 0047)   acetaminophen (TYLENOL) tablet 975 mg (975 mg Oral Given 6/22/23 0047)       Diagnostic Studies  Results Reviewed     None                 XR knee 4+ views Right injury   ED Interpretation by Roderick Alas DO (06/22 0125)   Wet read - normal knee xray no fx dislocation, normal joint spaces no effusion      Final Result by Mac Enamorado MD (06/22 1443)      No acute osseous abnormality  Findings are stable      Workstation performed: [id number]               Procedures  Procedures      ED Course                                       Medical Decision Making  Patient is a 81-year-old male presenting for evaluation of right knee pain    Differential: Musculoskeletal pain versus Lyme although reinfection or recrudescence is unlikely that it was treated at that time with doxycycline    Plan: X-rays analgesia monitor and reassess likely discharge    X-ray normal no effusion no need for arthrocentesis at this time  Patient is remained hemodynamically stable feels improved after analgesia, it is clear for discharge outpatient follow-up with his PCP  Return precautions given patient verbalized understanding    Amount and/or Complexity of Data Reviewed  Radiology: ordered and independent interpretation performed  Risk  OTC drugs  Prescription drug management              Disposition  Final diagnoses:   Right knee pain, unspecified chronicity     Time reflects when diagnosis was documented in both MDM as applicable and the Disposition within this note     Time User Action Codes Description Comment    6/22/2023  1:25 AM Betsy Guerin Add [Q60 872] Right knee pain, unspecified chronicity       ED Disposition     ED Disposition   Discharge    Condition   Stable    Date/Time   Thu Jun 22, 2023  1:26 AM    Comment   Adebayo Mendoza discharge to home/self care  Follow-up Information     Follow up With Specialties Details Why Contact Info    [employer]  Call in 2 days  [phone]            Discharge Medication List as of 6/22/2023  1:27 AM      CONTINUE these medications which have NOT CHANGED    Details   ibuprofen (MOTRIN) 600 mg tablet Take 1 tablet (600 mg total) by mouth every 6 (six) hours as needed for moderate pain, Starting Sun 4/17/2022, Normal      naproxen (NAPROSYN) 500 mg tablet Take 1 tablet (500 mg total) by mouth 2 (two) times a day with meals for 7 days, Starting Wed 4/26/2023, Until Wed 5/3/2023, Normal      polyethylene glycol (GLYCOLAX) 17 GM/SCOOP powder Take 17 g by mouth daily, Starting Tue 7/5/2022, Normal               PDMP Review     None           ED Provider  Attending physically available and evaluated Adebayo Mendoza I managed the patient along with the ED Attending      Electronically Signed by         Yang Quiñonez DO  06/24/23 3882